# Patient Record
Sex: MALE | Race: WHITE | NOT HISPANIC OR LATINO | URBAN - METROPOLITAN AREA
[De-identification: names, ages, dates, MRNs, and addresses within clinical notes are randomized per-mention and may not be internally consistent; named-entity substitution may affect disease eponyms.]

---

## 2017-07-05 ENCOUNTER — OFFICE VISIT (OUTPATIENT)
Dept: INTERNAL MEDICINE | Facility: CLINIC | Age: 37
End: 2017-07-05

## 2017-07-05 VITALS
HEART RATE: 68 BPM | OXYGEN SATURATION: 99 % | RESPIRATION RATE: 18 BRPM | WEIGHT: 154 LBS | SYSTOLIC BLOOD PRESSURE: 100 MMHG | BODY MASS INDEX: 21.56 KG/M2 | DIASTOLIC BLOOD PRESSURE: 70 MMHG | HEIGHT: 71 IN

## 2017-07-05 DIAGNOSIS — Z78.9 MALE-TO-FEMALE TRANSGENDER PERSON: Primary | ICD-10-CM

## 2017-07-05 DIAGNOSIS — R53.83 FATIGUE, UNSPECIFIED TYPE: ICD-10-CM

## 2017-07-05 PROCEDURE — 99203 OFFICE O/P NEW LOW 30 MIN: CPT | Performed by: FAMILY MEDICINE

## 2017-07-05 RX ORDER — FINASTERIDE 5 MG/1
5 TABLET, FILM COATED ORAL DAILY
COMMUNITY

## 2017-07-05 RX ORDER — LAMOTRIGINE 150 MG/1
150 TABLET ORAL DAILY
COMMUNITY

## 2017-07-05 RX ORDER — SPIRONOLACTONE 100 MG/1
100 TABLET, FILM COATED ORAL DAILY
COMMUNITY

## 2017-07-05 NOTE — PROGRESS NOTES
Subjective   Silver Patiño is a 37 y.o. male who presents to the clinic to Establish Care      History of Present Illness  He reports that one of his previous PCPs was Dr. Durga Driver in [place], [place].  Has also seen a physician in [place], unsure of name.  Specialists include: None  Prescription medications include: estradiol, finasteride, lamictal, spironolactone  OTC medications include: None    Patient reports that he is in the process of transitioning from male to female, starting in March 2015.  Was previously taking estradiol and spironolactone, but recently ran out.  Currently taking finasteride, which was started a few months ago.  Last hormone levels were reportedly checked about 5 months ago.  Denies any concerning medication side effects.  States that his last PCP in [place] was managing his medications, but only saw them a few times (unsure of their name).  Reports that he has seen multiple doctors for treatment of the past few years, but is unsure of their names as well.    Also reports complaint of chronic fatigue, associated with poor appetite, hypersomnia, constipation and unintentional weight loss.  Has a history of bipolar depression and is currently taking Lamictal.  Denies diarrhea, cold or hot intolerance.  Also denies any known history of vitamin D deficiency, thyroid disease or anemia, but states that he has not had these labs checked before.  Currently seeing a therapist for his bipolar depression and denies current SI or HI.    Review of Systems   Constitutional: Positive for fatigue (chronic). Negative for appetite change, chills and fever.   HENT: Negative for congestion, ear pain, rhinorrhea, sinus pressure and sore throat.    Eyes: Negative for pain and visual disturbance.   Respiratory: Positive for cough (chronic, unchanged). Negative for shortness of breath.         Negative for hemoptysis.   Cardiovascular: Negative for chest pain and palpitations.   Gastrointestinal: Negative  "for abdominal pain, constipation, diarrhea, nausea and vomiting.   Genitourinary: Negative for decreased urine volume, dysuria and hematuria.   Musculoskeletal: Negative for back pain and gait problem.   Skin: Negative for pallor and rash.   Neurological: Positive for weakness (generalized). Negative for dizziness, syncope and headaches.   Psychiatric/Behavioral: Positive for sleep disturbance (hypersomnia). Negative for self-injury and suicidal ideas. The patient is nervous/anxious.         Positive for depressed mood.     All other systems reviewed and are negative.    Past Medical History:   Diagnosis Date   • Anxiety    • Bipolar 1 disorder    • Depression    • History of gastric ulcer    • Migraine        Family History   Problem Relation Age of Onset   • Family history unknown: Yes       Past Surgical History:   Procedure Laterality Date   • WISDOM TOOTH EXTRACTION         Social History     Social History   • Marital status: Single     Spouse name: N/A   • Number of children: N/A   • Years of education: N/A     Occupational History   • Not on file.     Social History Main Topics   • Smoking status: Current Every Day Smoker     Packs/day: 0.50     Types: Cigarettes   • Smokeless tobacco: Never Used   • Alcohol use No   • Drug use: No   • Sexual activity: Not on file     Other Topics Concern   • Not on file     Social History Narrative   • No narrative on file         Current Outpatient Prescriptions:   •  ESTRADIOL PO, Take 1 mg by mouth., Disp: , Rfl:   •  finasteride (PROSCAR) 5 MG tablet, Take 5 mg by mouth Daily. Take 0.25 tablet a day., Disp: , Rfl:   •  lamoTRIgine (LAMICTAL) 150 MG tablet, Take 150 mg by mouth Daily., Disp: , Rfl:   •  spironolactone (ALDACTONE) 100 MG tablet, Take 100 mg by mouth Daily., Disp: , Rfl:     Objective   /70  Pulse 68  Resp 18  Ht 71\" (180.3 cm)  Wt 154 lb (69.9 kg)  SpO2 99%  BMI 21.48 kg/m2     Physical Exam   Constitutional: He is oriented to person, place, " and time. He appears well-developed and well-nourished.   No acute distress.   HENT:   Head: Normocephalic and atraumatic.   Right Ear: External ear normal.   Left Ear: External ear normal.   Nose: Nose normal.   Mouth/Throat: Oropharynx is clear and moist.   Eyes: Conjunctivae and EOM are normal. Pupils are equal, round, and reactive to light.   Neck: Normal range of motion. Neck supple.   Cardiovascular: Normal rate, regular rhythm, normal heart sounds and intact distal pulses.    Pulmonary/Chest: Effort normal and breath sounds normal. No respiratory distress. He has no wheezes.   Abdominal: Soft. Bowel sounds are normal. There is no tenderness.   Musculoskeletal: Normal range of motion.   Normal gait.   Neurological: He is alert and oriented to person, place, and time.   Skin: Skin is warm and dry.   Psychiatric: He has a normal mood and affect. His behavior is normal.   Vitals reviewed.      Assessment/Plan   Silver was seen today for establish care.    Diagnoses and all orders for this visit:    Male-to-female transgender person  -     Ambulatory Referral to Endocrinology    Explained to patient that I do not manage chronic estrogen use.  Patient voiced understanding.  Will order a referral for endocrinology today.    Fatigue, unspecified type  -     CBC & Differential  -     Vitamin D 25 Hydroxy  -     Comprehensive Metabolic Panel  -     TSH    Patient states that he does not have insurance.  Orders for the above labs were given to patient today.  Advised patient to go to [facility] to have these done, but advised that he will need to pay out of pocket for these labs.  Patient voiced understanding.

## 2017-07-05 NOTE — PATIENT INSTRUCTIONS
It was nice meeting you today!  I look forward to getting to know you and helping you with your primary care needs.  Please sign a consent to request medical records from your previous primary care provider.  I have given you orders for labs.  Please go to the [facility] location off [address] to have these done.  I have ordered a endocrinology referral for you.  You will be called to set up an appointment with this specialist.  Recommend that you sign up for My Chart (our patient portal).  You will be able to access lab results, request appointments and send our office messages.  If you are interested, please ask for My Chart access information at the check out desk.  Please schedule an appointment for your annual physical exam (if you have not already had one for this year) at your earliest convenience.  Please follow-up as indicated.  Return to the clinic sooner if you have any other concerns.